# Patient Record
Sex: FEMALE | Race: WHITE
[De-identification: names, ages, dates, MRNs, and addresses within clinical notes are randomized per-mention and may not be internally consistent; named-entity substitution may affect disease eponyms.]

---

## 2018-03-21 ENCOUNTER — HOSPITAL ENCOUNTER (OUTPATIENT)
Dept: HOSPITAL 47 - LABWHC1 | Age: 33
Discharge: HOME | End: 2018-03-21
Payer: COMMERCIAL

## 2018-03-21 DIAGNOSIS — R63.5: ICD-10-CM

## 2018-03-21 DIAGNOSIS — G47.00: ICD-10-CM

## 2018-03-21 DIAGNOSIS — R53.83: Primary | ICD-10-CM

## 2018-03-21 LAB
ALBUMIN SERPL-MCNC: 4.2 G/DL (ref 3.5–5)
ALP SERPL-CCNC: 60 U/L (ref 38–126)
ALT SERPL-CCNC: 33 U/L (ref 9–52)
ANION GAP SERPL CALC-SCNC: 10 MMOL/L
AST SERPL-CCNC: 26 U/L (ref 14–36)
BASOPHILS # BLD AUTO: 0 K/UL (ref 0–0.2)
BASOPHILS NFR BLD AUTO: 1 %
BUN SERPL-SCNC: 17 MG/DL (ref 7–17)
CALCIUM SPEC-MCNC: 9.2 MG/DL (ref 8.4–10.2)
CHLORIDE SERPL-SCNC: 101 MMOL/L (ref 98–107)
CO2 SERPL-SCNC: 28 MMOL/L (ref 22–30)
EOSINOPHIL # BLD AUTO: 0 K/UL (ref 0–0.7)
EOSINOPHIL NFR BLD AUTO: 1 %
ERYTHROCYTE [DISTWIDTH] IN BLOOD BY AUTOMATED COUNT: 4.68 M/UL (ref 3.8–5.4)
ERYTHROCYTE [DISTWIDTH] IN BLOOD: 11.7 % (ref 11.5–15.5)
GLUCOSE SERPL-MCNC: 87 MG/DL (ref 74–99)
HCT VFR BLD AUTO: 41.4 % (ref 34–46)
HGB BLD-MCNC: 14.4 GM/DL (ref 11.4–16)
LYMPHOCYTES # SPEC AUTO: 1.7 K/UL (ref 1–4.8)
LYMPHOCYTES NFR SPEC AUTO: 45 %
MCH RBC QN AUTO: 30.8 PG (ref 25–35)
MCHC RBC AUTO-ENTMCNC: 34.8 G/DL (ref 31–37)
MCV RBC AUTO: 88.4 FL (ref 80–100)
MONOCYTES # BLD AUTO: 0.3 K/UL (ref 0–1)
MONOCYTES NFR BLD AUTO: 7 %
NEUTROPHILS # BLD AUTO: 1.6 K/UL (ref 1.3–7.7)
NEUTROPHILS NFR BLD AUTO: 43 %
PLATELET # BLD AUTO: 192 K/UL (ref 150–450)
POTASSIUM SERPL-SCNC: 4 MMOL/L (ref 3.5–5.1)
PROT SERPL-MCNC: 7.1 G/DL (ref 6.3–8.2)
SODIUM SERPL-SCNC: 139 MMOL/L (ref 137–145)
T4 FREE SERPL-MCNC: 1.34 NG/DL (ref 0.78–2.19)
VIT B12 SERPL-MCNC: 470 PG/ML (ref 200–944)
WBC # BLD AUTO: 3.8 K/UL (ref 3.8–10.6)

## 2018-03-21 PROCEDURE — 84439 ASSAY OF FREE THYROXINE: CPT

## 2018-03-21 PROCEDURE — 83540 ASSAY OF IRON: CPT

## 2018-03-21 PROCEDURE — 82728 ASSAY OF FERRITIN: CPT

## 2018-03-21 PROCEDURE — 36415 COLL VENOUS BLD VENIPUNCTURE: CPT

## 2018-03-21 PROCEDURE — 83550 IRON BINDING TEST: CPT

## 2018-03-21 PROCEDURE — 82607 VITAMIN B-12: CPT

## 2018-03-21 PROCEDURE — 82306 VITAMIN D 25 HYDROXY: CPT

## 2018-03-21 PROCEDURE — 80053 COMPREHEN METABOLIC PANEL: CPT

## 2018-03-21 PROCEDURE — 85025 COMPLETE CBC W/AUTO DIFF WBC: CPT

## 2018-03-21 PROCEDURE — 84443 ASSAY THYROID STIM HORMONE: CPT

## 2018-03-21 PROCEDURE — 84480 ASSAY TRIIODOTHYRONINE (T3): CPT

## 2018-03-27 ENCOUNTER — HOSPITAL ENCOUNTER (OUTPATIENT)
Dept: HOSPITAL 47 - RADFLMAIN | Age: 33
Discharge: HOME | End: 2018-03-27
Payer: MEDICAID

## 2018-03-27 DIAGNOSIS — R13.14: Primary | ICD-10-CM

## 2018-03-27 PROCEDURE — 74220 X-RAY XM ESOPHAGUS 1CNTRST: CPT

## 2018-03-27 NOTE — FL
Barium swallow

 

HISTORY: Dysphagia

 

1 minute 25 seconds fluoroscopy time. 84 images.

 

Patient was given high density barium to drink. The swallowing mechanism is normal. There is no extri
nsic or intrinsic esophageal lesion evident. Patient did show some hesitancy in initiating her swallo
w. No gastroesophageal reflux was evident, no hiatal hernia.

 

IMPRESSION: No evident mass. Consider CT of the neck with contrast for additional evaluation for poss
ible mass.

## 2018-05-08 ENCOUNTER — HOSPITAL ENCOUNTER (OUTPATIENT)
Dept: HOSPITAL 47 - RADCTMAIN | Age: 33
Discharge: HOME | End: 2018-05-08
Payer: MEDICAID

## 2018-05-08 DIAGNOSIS — R13.14: Primary | ICD-10-CM

## 2018-05-08 PROCEDURE — 70491 CT SOFT TISSUE NECK W/DYE: CPT

## 2018-05-09 NOTE — CT
EXAMINATION TYPE: CT soft tissue neck w con

 

DATE OF EXAM: 5/8/2018

 

HISTORY: Dysphagia.

 

COMPARISON: Barium swallow study March 27, 2018

 

CT DLP: 307 mGycm.  Automated Exposure Control for Dose Reduction was Utilized.

 

TECHNIQUE:  CT scan of the neck is performed with IV Contrast, patient injected with 100ml mL of Isov
ue M300, axial images are obtained, coronal and sagittal reformatted images are reviewed.

 

FINDINGS:

 

Airway: There is incomplete imaging of the nasopharyngeal airway to include the adenoid tonsils. Ther
e is slight prominence of the hard palate with mass effect on the nasopharyngeal and oropharyngeal ai
rways. Some secretions are seen in the vallecula. Epiglottis is felt within normal limits. Hypopharyn
geal airway at level of piriform sinuses is felt within normal limits. Thyroid gland is unremarkable.
 Lung apices are clear.

 

Parotid/submandibular glands:  No gross abnormality seen.

 

Carotid/Vascular Structures: No suspicious atherosclerotic change or stenosis at carotid bulbs. 

 

Osseous Structures: Some reversal of normal cervical curvature with spur disc complex C6-C7 level eff
acing anterior thecal sac on sagittal image 45 .

 

Other: There is partial visualization of subpectoral breast implants. There are some scattered subcen
timeter lymph nodes. There is no suspicious greater than 1 cm neck adenopathy. Parapharyngeal fat spa
jacobo are maintained bilaterally.

 

IMPRESSION:  No suspicious mass or adenopathy is seen to account for patient's symptoms.

## 2020-05-13 ENCOUNTER — HOSPITAL ENCOUNTER (OUTPATIENT)
Dept: HOSPITAL 47 - LABWHC1 | Age: 35
Discharge: HOME | End: 2020-05-13
Attending: OBSTETRICS & GYNECOLOGY
Payer: MEDICAID

## 2020-05-13 DIAGNOSIS — O20.0: Primary | ICD-10-CM

## 2020-05-13 PROCEDURE — 84702 CHORIONIC GONADOTROPIN TEST: CPT

## 2020-05-13 PROCEDURE — 86901 BLOOD TYPING SEROLOGIC RH(D): CPT

## 2020-05-13 PROCEDURE — 36415 COLL VENOUS BLD VENIPUNCTURE: CPT

## 2020-05-13 PROCEDURE — 86850 RBC ANTIBODY SCREEN: CPT

## 2020-05-13 PROCEDURE — 86900 BLOOD TYPING SEROLOGIC ABO: CPT

## 2020-05-15 ENCOUNTER — HOSPITAL ENCOUNTER (OUTPATIENT)
Dept: HOSPITAL 47 - LABWHC1 | Age: 35
Discharge: HOME | End: 2020-05-15
Attending: OBSTETRICS & GYNECOLOGY
Payer: MEDICAID

## 2020-05-15 DIAGNOSIS — O20.0: Primary | ICD-10-CM

## 2020-05-15 PROCEDURE — 84702 CHORIONIC GONADOTROPIN TEST: CPT

## 2020-05-15 PROCEDURE — 36415 COLL VENOUS BLD VENIPUNCTURE: CPT

## 2020-12-24 ENCOUNTER — HOSPITAL ENCOUNTER (INPATIENT)
Dept: HOSPITAL 47 - 4FBP | Age: 35
LOS: 1 days | Discharge: HOME | End: 2020-12-25
Attending: OBSTETRICS & GYNECOLOGY | Admitting: OBSTETRICS & GYNECOLOGY
Payer: MEDICAID

## 2020-12-24 DIAGNOSIS — Q69.9: ICD-10-CM

## 2020-12-24 DIAGNOSIS — Z3A.37: ICD-10-CM

## 2020-12-24 LAB
ALT SERPL-CCNC: 31 U/L (ref 4–34)
AST SERPL-CCNC: 36 U/L (ref 14–36)
BASOPHILS # BLD AUTO: 0 K/UL (ref 0–0.2)
BASOPHILS NFR BLD AUTO: 0 %
BUN SERPL-SCNC: 10 MG/DL (ref 7–17)
EOSINOPHIL # BLD AUTO: 0.1 K/UL (ref 0–0.7)
EOSINOPHIL NFR BLD AUTO: 1 %
ERYTHROCYTE [DISTWIDTH] IN BLOOD BY AUTOMATED COUNT: 4.08 M/UL (ref 3.8–5.4)
ERYTHROCYTE [DISTWIDTH] IN BLOOD: 12.4 % (ref 11.5–15.5)
HCT VFR BLD AUTO: 37.6 % (ref 34–46)
HGB BLD-MCNC: 13.4 GM/DL (ref 11.4–16)
LDH SPEC-CCNC: 524 U/L (ref 313–618)
LYMPHOCYTES # SPEC AUTO: 1.7 K/UL (ref 1–4.8)
LYMPHOCYTES NFR SPEC AUTO: 24 %
MCH RBC QN AUTO: 32.8 PG (ref 25–35)
MCHC RBC AUTO-ENTMCNC: 35.6 G/DL (ref 31–37)
MCV RBC AUTO: 92.3 FL (ref 80–100)
MONOCYTES # BLD AUTO: 0.4 K/UL (ref 0–1)
MONOCYTES NFR BLD AUTO: 5 %
NEUTROPHILS # BLD AUTO: 4.9 K/UL (ref 1.3–7.7)
NEUTROPHILS NFR BLD AUTO: 68 %
PLATELET # BLD AUTO: 163 K/UL (ref 150–450)
URATE SERPL-MCNC: 5.9 MG/DL (ref 3.7–7.4)
WBC # BLD AUTO: 7.2 K/UL (ref 3.8–10.6)

## 2020-12-24 PROCEDURE — 85025 COMPLETE CBC W/AUTO DIFF WBC: CPT

## 2020-12-24 PROCEDURE — 84550 ASSAY OF BLOOD/URIC ACID: CPT

## 2020-12-24 PROCEDURE — 86900 BLOOD TYPING SEROLOGIC ABO: CPT

## 2020-12-24 PROCEDURE — 83615 LACTATE (LD) (LDH) ENZYME: CPT

## 2020-12-24 PROCEDURE — 84520 ASSAY OF UREA NITROGEN: CPT

## 2020-12-24 PROCEDURE — 00HU33Z INSERTION OF INFUSION DEVICE INTO SPINAL CANAL, PERCUTANEOUS APPROACH: ICD-10-PCS

## 2020-12-24 PROCEDURE — 3E0R3BZ INTRODUCTION OF ANESTHETIC AGENT INTO SPINAL CANAL, PERCUTANEOUS APPROACH: ICD-10-PCS

## 2020-12-24 PROCEDURE — 86901 BLOOD TYPING SEROLOGIC RH(D): CPT

## 2020-12-24 PROCEDURE — 82565 ASSAY OF CREATININE: CPT

## 2020-12-24 PROCEDURE — 84460 ALANINE AMINO (ALT) (SGPT): CPT

## 2020-12-24 PROCEDURE — 84450 TRANSFERASE (AST) (SGOT): CPT

## 2020-12-24 PROCEDURE — 86850 RBC ANTIBODY SCREEN: CPT

## 2020-12-24 RX ADMIN — DOCUSATE SODIUM AND SENNOSIDES SCH EACH: 50; 8.6 TABLET ORAL at 19:59

## 2020-12-24 RX ADMIN — POTASSIUM CHLORIDE SCH MLS/HR: 14.9 INJECTION, SOLUTION INTRAVENOUS at 12:10

## 2020-12-24 RX ADMIN — POTASSIUM CHLORIDE SCH MLS/HR: 14.9 INJECTION, SOLUTION INTRAVENOUS at 12:47

## 2020-12-24 NOTE — P.HPOB
History of Present Illness


H&P Date: 20


Chief Complaint: Hypertension at 37-4/7 weeks' gestation





This is a 35-year-old  3 para  woman with an estimated due date of 

2021 based on first trimester ultrasound.  She presented for routine 

 appointment at 37-3/7 weeks gestation.  She was complaining of not 

"feeling well" with mild headache, "floaters" in her vision and increased pelvic

pressure.  She notes some mild increase in lower extremity edema over the last 1

week which she attributed to working long shifts at work.  She has had good 

fetal movement.  She has ongoing uncomfortable vulvar varicosities.





Upon evaluation in the office for blood pressure was found to be 160/108 and did

not change with repeat blood pressure testing.  Her urine protein was negative. 

She has 1+ bilateral lower extremity edema.  Her cervix was 5+ centimeters 

dilated.  Decision was made to the admit to the hospital for induction of labor 

and rule out preeclampsia.





Upon initial presentation to labor and delivery her blood pressure is 155/72.  

She has 1+ bilateral lower extremity edema and 3+ deep tendon reflexes with no 

clonus.  Abdomen is gravid with no right upper quadrant pain.  On pelvic exami

nation the cervix is 5-6 cm dilated 70% effaced with a bulging membranes and the

vertex in the -2 station.  Artificial rupture of membranes is undertaken and 

copious clear fluid is noted.  Patient is irregularly corwin.





Laboratory data blood type A+, antibody screen negative, rubella immune, VDRL 

nonreactive, hep Amirah surface antigen negative, HIV negative, gonorrhea and 

clinic cultures negative, group B strep negative.





On admission on the CBC shows platelets of 163,000.  Remainder PIH labs are 

pending.





Review of Systems


All systems: negative





Medications and Allergies


                                Home Medications











 Medication  Instructions  Recorded  Confirmed  Type


 


Pnv No.95/Ferrous Fum/Folic AC 1 each PO DAILY 20 History





[Prenatal Multivitamin Tablet]    








                                    Allergies











Allergy/AdvReac Type Severity Reaction Status Date / Time


 


No Known Allergies Allergy   Verified 20 11:42














Exam


                                Intake and Output











 20





 22:59 06:59 14:59


 


Other:   


 


  Weight   69.4 kg














HPI





Results


Result Diagrams: 


                                 20 11:55








Assessment and Plan


(1) 37 or more weeks gestation of pregnancy


Current Visit: Yes   Status: Acute   Code(s): HAW1404 -    SNOMED Code(s): 

64368170


   





(2) PIH (pregnancy induced hypertension)


Narrative/Plan: 


Urine protein negative.  PIH labs pending.  Magnesium sulfate if indicated.


Current Visit: Yes   Status: Acute   Code(s): O13.9 - GESTATIONAL HTN W/O 

SIGNIFICANT PROTEINURIA, UNSP TRIMESTER   SNOMED Code(s): 80337448


   





(3) Vulvar varicose veins


Current Visit: Yes   Status: Acute   Code(s): I86.3 - VULVAL VARICES   SNOMED 

Code(s): 77030780


   


Plan: 





35-year-old  3 para 2002 woman admitted at 37-4/7 weeks' gestation with 

pregnancy-induced hypertension, rule out preeclampsia.  Her urine protein was 

negative.  She was advanced cervical dilation and artificial for membranes plus 

Pitocin induction of labor is initiated per protocol.  If there are no areas of 

her labs or any significant further elevation of blood pressure, magnesium 

sulfate seizure prophylaxis will be initiated.  The anesthesiologist has been 

consulted for placement of labor epidural.  Fetal status is currently reassuring

with fetal heart tones at pregnancy category 1 and irregular contractions.  She 

is group B strep negative and Rh+.  I anticipate normal spontaneous vaginal 

delivery.

## 2020-12-24 NOTE — P.PROBDLV
Vaginal Delivery Note





- .


Vaginal Delivery Note: 





Findings: Female infant in the direct occiput posterior position with Apgars of 

9 at 1 minute and 9 at 5 minutes, weight pending at the time of this dictation. 

Intact, three-vessel cord placenta.  Examination the infant reveals polydactyly 

on the left hand.  No perineal lacerations noted.  EBL approximately 100 mL's.


Delivery summary: This is a 35-year-old  3 para 2 woman who was admitted 

at 37-4/7 weeks gestation from the office with significantly elevated blood 

pressures.  Upon admission she had blood pressures of 150s over 70s.  PIH labs 

were within normal limits.  She had advanced cervical dilation and underwent 

artificial rupture of membranes and clear fluid was noted.  She was 5+ 

centimeters dilated.  Pitocin induction of labor was then initiated.  She 

received an epidural anesthetic.  She had an approximately 2 hour first stage of

labor.  Fetal heart tones were reassuring throughout.  When she reached complete

cervical dilation she had strong urge to push.  She was repositioned, prepped 

and draped in the dorsal modified Mohamud position.  With additional maternal 

effort the head crowned from the direct occiput posterior position.  The head 

delivered rapidly followed by the rest of the infant.  The nose and mouth were 

bulb suctioned and the infant was placed on the maternal abdomen.  After the 

cord was done pulsating approximately 2 minutes it was clamped and cut.  

Examination of the infant at this time revealed polydactyly.  Apgars were 9 at 1

minute and 9 at 5 minutes.  An intact, three-vessel cord placenta was 

spontaneously delivered after an approximately 5 minute third stage of labor.  

The uterus was massaged and was noted to be firm 3 cm below the umbilicus.  The 

vagina and cervix were inspected and no further lacerations were noted.  

Significant reduction of the right vulvar varicosities were appreciated.  The 

patient received Pitocin following the third stage of labor.  Both mother and 

infant were doing well post delivery in the room.  The pediatrician was notified

regarding findings of the infant will examine and .

## 2020-12-25 VITALS — SYSTOLIC BLOOD PRESSURE: 123 MMHG | HEART RATE: 75 BPM | DIASTOLIC BLOOD PRESSURE: 79 MMHG | RESPIRATION RATE: 16 BRPM

## 2020-12-25 VITALS — TEMPERATURE: 97.6 F

## 2020-12-25 LAB
BASOPHILS # BLD AUTO: 0 K/UL (ref 0–0.2)
BASOPHILS NFR BLD AUTO: 1 %
EOSINOPHIL # BLD AUTO: 0.1 K/UL (ref 0–0.7)
EOSINOPHIL NFR BLD AUTO: 1 %
ERYTHROCYTE [DISTWIDTH] IN BLOOD BY AUTOMATED COUNT: 3.71 M/UL (ref 3.8–5.4)
ERYTHROCYTE [DISTWIDTH] IN BLOOD: 12.3 % (ref 11.5–15.5)
HCT VFR BLD AUTO: 34.9 % (ref 34–46)
HGB BLD-MCNC: 11.9 GM/DL (ref 11.4–16)
LYMPHOCYTES # SPEC AUTO: 1.5 K/UL (ref 1–4.8)
LYMPHOCYTES NFR SPEC AUTO: 27 %
MCH RBC QN AUTO: 32.2 PG (ref 25–35)
MCHC RBC AUTO-ENTMCNC: 34.3 G/DL (ref 31–37)
MCV RBC AUTO: 93.9 FL (ref 80–100)
MONOCYTES # BLD AUTO: 0.3 K/UL (ref 0–1)
MONOCYTES NFR BLD AUTO: 5 %
NEUTROPHILS # BLD AUTO: 3.7 K/UL (ref 1.3–7.7)
NEUTROPHILS NFR BLD AUTO: 66 %
PLATELET # BLD AUTO: 101 K/UL (ref 150–450)
WBC # BLD AUTO: 5.6 K/UL (ref 3.8–10.6)

## 2020-12-25 RX ADMIN — DOCUSATE SODIUM AND SENNOSIDES SCH EACH: 50; 8.6 TABLET ORAL at 09:42

## 2020-12-25 NOTE — P.DS
Providers


Date of admission: 


20 11:29





Expected date of discharge: 20


Attending physician: 


Linda Wakefield





Primary care physician: 


Stated None








- Discharge Diagnosis(es)


(1) 37 or more weeks gestation of pregnancy


Current Visit: Yes   Status: Acute   





(2) PIH (pregnancy induced hypertension)


Current Visit: Yes   Status: Acute   





(3) Vulvar varicose veins


Current Visit: Yes   Status: Acute   





(4) Normal spontaneous vaginal delivery


Current Visit: Yes   Status: Acute   


Hospital Course: 





This is a 35 year old  3 now para 3 woman who is admitted at 37-4/7 

weeks' gestation with elevated blood pressures at routine  visit.  She 

was admitted and underwent preeclamptic evaluation which showed negative labs.  

Her labor was induced is she was also of advanced cervical dilation with cervix 

5+ centimeters dilated.  She underwent infant artificial rupture of membranes 

with Pitocin induction of labor.  She received an epidural anesthetic.  Her 

initial blood pressures on admission were 150s over 70s and these responded to 

the 120s over 70s with epidural placement.  She went on to deliver a liveborn 

female infant from the occiput posterior position with Apgars of 9 at 1 minute 

and 9 at 5 minutes weighing 6 lbs. 5 oz.  Of note the infant did have 

polydactyly of one hand.  The patient postpartum course was unremarkable.  Her 

blood pressures ranged from the 130s to 150s over 70s to 80s.  She remained 

asymptomatic without complaints of headaches, visual changes, nausea, vomiting 

or abdominal pain.  By the morning of postpartum day #1 she was ambulating and 

voiding without difficulty.  Her lochia was minimal.  She was breast-feeding 

successfully.  She was therefore discharged home with routine instructions for 

postpartum care and follow-up as well as precautions for PIH.  She return to the

office in 3-5 days for blood pressure check.


Patient Condition at Discharge: Good





Plan - Discharge Summary


New Discharge Prescriptions: 


No Action


   Pnv No.95/Ferrous Fum/Folic AC [Prenatal Multivitamin Tablet] 1 each PO DAILY


Discharge Medication List





Pnv No.95/Ferrous Fum/Folic AC [Prenatal Multivitamin Tablet] 1 each PO DAILY 

20 [History]








Follow up Appointment(s)/Referral(s): 


Linda Wakefield MD [STAFF PHYSICIAN] - 3 Days (BP check on Monday or tuesday)


Activity/Diet/Wound Care/Special Instructions: 


Follow-up in the office in 6 weeks postpartum.  Call with any concerning signs 

or symptoms including heavy vaginal bleeding, severe abdominal pain, fever 

greater than 101, swelling or redness of the lower extremities, foul vaginal 

discharge, or signs of postpartum depression.  Specifically signs of 

hypertension reviewed including headaches, visual changes, increased swelling of

the lower extremities.  Return to the office in 3-5 days for blood pressure 

check.  Nothing in the vagina for 6 weeks after delivery, specifically no 

intercourse.


Discharge Disposition: HOME SELF-CARE

## 2021-12-14 ENCOUNTER — HOSPITAL ENCOUNTER (OUTPATIENT)
Dept: HOSPITAL 47 - RADUSWWP | Age: 36
Discharge: HOME | End: 2021-12-14
Attending: FAMILY MEDICINE
Payer: MEDICAID

## 2021-12-14 DIAGNOSIS — R10.13: Primary | ICD-10-CM

## 2021-12-14 DIAGNOSIS — K82.4: ICD-10-CM

## 2021-12-14 PROCEDURE — 76700 US EXAM ABDOM COMPLETE: CPT

## 2021-12-14 NOTE — US
EXAMINATION TYPE: US abdomen complete

 

DATE OF EXAM: 12/14/2021

 

COMPARISON: NONE

 

CLINICAL HISTORY: 36-year-old female R10.13 Epigastric pain.

 

EXAM MEASUREMENTS:

 

Liver Length:  12.4 cm   

Gallbladder Wall:  0.1 cm   

CBD:  0.2 cm

Spleen:  10.7 cm   

Right Kidney:  9.6 x 4.5 x 5.6 cm 

Left Kidney:  10.2 x 5.5 x 4.2 cm   

 

 

 

Pancreas: Most of the pancreas is visualized and shows no gross abnormality. Portions are obscured by
 bowel gas shadowing.

Liver:  wnl  

Gallbladder: Nodular echogenic focus measuring 0.4cm along the anterior wall. No abnormal distention,
 wall thickening, pericholecystic fluid, or shadowing calculi.

**Evidence for sonographic Muñoz's sign:  no

CBD:  wnl 

Spleen:  wnl   

Right Kidney:  portions visualized wnl, partially obscured by overlying bowel

Left Kidney:  portions visualized wnl, partially obscured by overlying bowel  

Upper IVC:  wnl  

Abd Aorta:  bifurcation obscured by overlying bowel gas

 

 

 

IMPRESSION: 

Incidental 4 mm gallbladder wall polyp along the anterior wall. A six-month follow-up ultrasound can 
be performed. No gallstones or biliary ductal dilatation.